# Patient Record
Sex: MALE | Race: WHITE | HISPANIC OR LATINO | Employment: PART TIME | URBAN - METROPOLITAN AREA
[De-identification: names, ages, dates, MRNs, and addresses within clinical notes are randomized per-mention and may not be internally consistent; named-entity substitution may affect disease eponyms.]

---

## 2024-08-02 ENCOUNTER — OFFICE VISIT (OUTPATIENT)
Dept: FAMILY MEDICINE | Facility: CLINIC | Age: 39
End: 2024-08-02

## 2024-08-02 VITALS — RESPIRATION RATE: 17 BRPM | WEIGHT: 180 LBS | OXYGEN SATURATION: 97 % | TEMPERATURE: 97.8 F | HEART RATE: 77 BPM

## 2024-08-02 DIAGNOSIS — K42.9 UMBILICAL HERNIA WITHOUT OBSTRUCTION AND WITHOUT GANGRENE: Primary | ICD-10-CM

## 2024-08-02 PROCEDURE — 99203 OFFICE O/P NEW LOW 30 MIN: CPT | Performed by: EMERGENCY MEDICINE

## 2024-08-02 NOTE — PROGRESS NOTES
Impression:  Umbilical hernia without obstruction, incarceration or strangulation.  The hernia is reducible  Plan:  Refer to surgery.  In the meantime he should avoid lifting heavy objects or straining or any kind of Valsalva movements.  If the hernia comes out he can try to reduce it manually.  If he is unable to or if he develops severe pain or vomiting he should go to the ER.      Chief Complaint:  Patient presents with:  Abdominal Pain: Hernia surgery 2-3 ago.         HPI:   [place] is a 38 year old male who presents to this clinic for the evaluation of umbilical hernia.  Patient had an umbilical hernia repaired 2 to 3 years ago.  He was doing well until about 3 days ago when he lifted something and noticed a bulge and some pain in the umbilical area.  No nausea or vomiting.  No fever or chills.  No other painful areas.  Has been having normal bowel movements.  History is obtained through the use of a professional  over the phone      PMH:   No past medical history on file.  No past surgical history on file.      ROS:  All other systems negative    Meds:  No current outpatient medications on file.        Social:  Social History     Socioeconomic History    Marital status: Single     Spouse name: Not on file    Number of children: Not on file    Years of education: Not on file    Highest education level: Not on file   Occupational History    Not on file   Tobacco Use    Smoking status: Not on file    Smokeless tobacco: Not on file   Substance and Sexual Activity    Alcohol use: Not on file    Drug use: Not on file    Sexual activity: Not on file   Other Topics Concern    Not on file   Social History Narrative    Not on file     Social Determinants of Health     Financial Resource Strain: Not on file   Food Insecurity: Not on file   Transportation Needs: Not on file   Physical Activity: Not on file   Stress: Not on file   Social Connections: Not on file   Interpersonal Safety: Not on file    Housing Stability: Not on file         Physical Exam:  Vitals:    08/02/24 1418   Pulse: 77   Resp: 17   Temp: 97.8  F (36.6  C)   SpO2: 97%   Weight: 81.6 kg (180 lb)      Patient is awake, alert, no distress  Abdomen: There is a umbilical hernia which is easily reducible.  No other areas of tenderness or masses or other abnormalities    Results:    No results found for this or any previous visit (from the past 24 hour(s)).     No results found for this or any previous visit (from the past 24 hour(s)).       Jameson Worthington MD

## 2024-08-22 ENCOUNTER — APPOINTMENT (OUTPATIENT)
Dept: INTERPRETER SERVICES | Facility: CLINIC | Age: 39
End: 2024-08-22

## 2024-11-21 ENCOUNTER — APPOINTMENT (OUTPATIENT)
Dept: INTERPRETER SERVICES | Facility: CLINIC | Age: 39
End: 2024-11-21